# Patient Record
Sex: MALE | Race: BLACK OR AFRICAN AMERICAN | NOT HISPANIC OR LATINO | Employment: STUDENT | URBAN - METROPOLITAN AREA
[De-identification: names, ages, dates, MRNs, and addresses within clinical notes are randomized per-mention and may not be internally consistent; named-entity substitution may affect disease eponyms.]

---
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
## 2019-05-21 ENCOUNTER — OFFICE VISIT (OUTPATIENT)
Dept: PEDIATRICS | Facility: CLINIC | Age: 19
End: 2019-05-21
Payer: MEDICAID

## 2019-05-21 VITALS
DIASTOLIC BLOOD PRESSURE: 75 MMHG | TEMPERATURE: 98 F | SYSTOLIC BLOOD PRESSURE: 122 MMHG | HEART RATE: 70 BPM | BODY MASS INDEX: 33.21 KG/M2 | RESPIRATION RATE: 16 BRPM | HEIGHT: 67 IN | WEIGHT: 211.63 LBS

## 2019-05-21 DIAGNOSIS — Z00.00 WELL ADULT EXAM: Primary | ICD-10-CM

## 2019-05-21 DIAGNOSIS — B35.0 TINEA CAPITIS: ICD-10-CM

## 2019-05-21 PROCEDURE — 99999 PR PBB SHADOW E&M-EST. PATIENT-LVL III: CPT | Mod: PBBFAC,,, | Performed by: PEDIATRICS

## 2019-05-21 PROCEDURE — 99395 PR PREVENTIVE VISIT,EST,18-39: ICD-10-PCS | Mod: 25,S$PBB,, | Performed by: PEDIATRICS

## 2019-05-21 PROCEDURE — 99999 PR PBB SHADOW E&M-EST. PATIENT-LVL III: ICD-10-PCS | Mod: PBBFAC,,, | Performed by: PEDIATRICS

## 2019-05-21 PROCEDURE — 99395 PREV VISIT EST AGE 18-39: CPT | Mod: 25,S$PBB,, | Performed by: PEDIATRICS

## 2019-05-21 PROCEDURE — 90471 IMMUNIZATION ADMIN: CPT | Mod: PBBFAC,PO,VFC

## 2019-05-21 PROCEDURE — 99213 OFFICE O/P EST LOW 20 MIN: CPT | Mod: PBBFAC,PO | Performed by: PEDIATRICS

## 2019-05-21 NOTE — PROGRESS NOTES
Chief Complaint   Patient presents with    Annual Exam       Dianne Sotelo Jr. is a 18 y.o. male who is here for a yearly physical and preventive medicine exam.  He has a lesion on his penis which she does not want me to examine, so I recommended that he see a urologist.  He will decide that at a later date.  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  His asthma is well controlled and he has an MDI which he uses occasionally.    History     Past Medical History:   Diagnosis Date    Asthma     Eczema     RSV (acute bronchiolitis due to respiratory syncytial virus) Infant       Family History   Problem Relation Age of Onset    Diabetes Father     Asthma Father     Hypertension Maternal Grandmother     Hypertension Maternal Grandfather        Social History     Socioeconomic History    Marital status: Single     Spouse name: Not on file    Number of children: Not on file    Years of education: Not on file    Highest education level: Not on file   Occupational History    Not on file   Social Needs    Financial resource strain: Not on file    Food insecurity:     Worry: Not on file     Inability: Not on file    Transportation needs:     Medical: Not on file     Non-medical: Not on file   Tobacco Use    Smoking status: Never Smoker   Substance and Sexual Activity    Alcohol use: No    Drug use: No    Sexual activity: Never   Lifestyle    Physical activity:     Days per week: Not on file     Minutes per session: Not on file    Stress: Not on file   Relationships    Social connections:     Talks on phone: Not on file     Gets together: Not on file     Attends [employer]ology service: Not on file     Active member of club or organization: Not on file     Attends meetings of clubs or organizations: Not on file     Relationship status: Not on file   Other Topics Concern    Not on file   Social History Narrative    Lives with biological dad and stepmother.  2 brothers, 1 sister.   No smokers.  No pets.  In [employer].   Doing well in school.         Review of patient's allergies indicates:  No Known Allergies    No Known Allergies    Current Outpatient Medications on File Prior to Visit   Medication Sig Dispense Refill    albuterol (PROVENTIL) 2.5 mg /3 mL (0.083 %) nebulizer solution USE 1 VIAL VIA NEBULIZER EVERY 4 HOURS AS NEEDED 75 mL 0    VENTOLIN HFA 90 mcg/actuation inhaler INHALE 2 PUFFS BY MOUTH THREE TIMES DAILY 18 g 0    [DISCONTINUED] cetirizine (ZYRTEC) 10 MG tablet Take 1 tablet (10 mg total) by mouth once daily.      [DISCONTINUED] mometasone (NASONEX) 50 mcg/actuation nasal spray 2 sprays by Nasal route once daily. 17 g 3     No current facility-administered medications on file prior to visit.        ROS:  GENERAL: denies any fever, chills, wt. Loss or gain, fatigue or malaise  HEAD:  denies headaches or trauma; has hard vesicular lesions and his occipital area which he says that he has had for about a year.  EYES:  Denies burning, itching, tearing, or discharge  EARS:  Denies earache, ear drainage, or hearing loss  MOUTH & THROAT: denies sore throat, mouth pain, or difficulty swallowing  RESPIRATORY:  Denies shortness of breath, cough, or wheeze  CARDIOVASCULAR: denies chest pain, palpitations, edema, or dyspnea  GI: denies nausea, vomiting, pain, constipation or diarrhea  URINARY:  Denies frequency or dysuria  ENDOCRINE:  No polydipsia, polyuria, or dysphagia  MUSCULOSKELETAL: denies pain or stiffness of the joints  NEUROLOGIC:  No weakness, sensory changes, seizures, confusion, memory loss, tremor or other abnormal movements        Physical Exam:  Vitals:    05/21/19 1135   BP: 122/75   Pulse: 70   Resp: 16   Temp: 98.1 °F (36.7 °C)       GEN: WD, WN, NAD, alert and playful  HEENT: EOMI, PERRL, no drainage, neck supple, no adenopathy, pharynx non-erythematous  LYMPH: no cervical or axillary lymphadenopathy  CV: RRR, s1, s2, no murmurs, no palpitations, pulses 2+  (radial)  RESP: CTAB, no increased WOB, no wheeze, no respiratory distress  GI: soft, NT, ND, +BS, no guarding or rebound tenderness  :  Not examined due to patient refusal  MSK: normal ROM, no arthralgia, strength 5/5  Neuro: alert and oriented, no weakness, DTR 2+, normal gait  Skin: no rashes or lesions  Spine: no deformities or signs of scoliosis  Psych:appropriate mood and affect      Well adult exam  -     Tdap Vaccine    Tinea capitis  -     Ambulatory Referral to Dermatology          Plan:   1) WCC: Routine WCC, healthy and doing well. .  Will also give above ___ immunizations to be UTD.   RTC in 1 year for next physical or sooner if sick.  Routine counseling given on good eating habits, routine exercise, and good sleep hygiene.

## 2019-07-17 ENCOUNTER — INITIAL CONSULT (OUTPATIENT)
Dept: DERMATOLOGY | Facility: CLINIC | Age: 19
End: 2019-07-17
Payer: MEDICAID

## 2019-07-17 VITALS — BODY MASS INDEX: 33.12 KG/M2 | WEIGHT: 211 LBS | HEIGHT: 67 IN

## 2019-07-17 DIAGNOSIS — L73.0 ACNE KELOIDALIS NUCHAE: Primary | ICD-10-CM

## 2019-07-17 PROCEDURE — 99999 PR PBB SHADOW E&M-EST. PATIENT-LVL III: ICD-10-PCS | Mod: PBBFAC,,, | Performed by: DERMATOLOGY

## 2019-07-17 PROCEDURE — 99202 PR OFFICE/OUTPT VISIT, NEW, LEVL II, 15-29 MIN: ICD-10-PCS | Mod: S$PBB,,, | Performed by: DERMATOLOGY

## 2019-07-17 PROCEDURE — 99999 PR PBB SHADOW E&M-EST. PATIENT-LVL III: CPT | Mod: PBBFAC,,, | Performed by: DERMATOLOGY

## 2019-07-17 PROCEDURE — 99213 OFFICE O/P EST LOW 20 MIN: CPT | Mod: PBBFAC,PO | Performed by: DERMATOLOGY

## 2019-07-17 PROCEDURE — 99202 OFFICE O/P NEW SF 15 MIN: CPT | Mod: S$PBB,,, | Performed by: DERMATOLOGY

## 2019-07-17 RX ORDER — CLINDAMYCIN PHOSPHATE 10 UG/ML
LOTION TOPICAL
Qty: 60 ML | Refills: 6 | Status: SHIPPED | OUTPATIENT
Start: 2019-07-17 | End: 2019-07-24

## 2019-07-17 RX ORDER — BETAMETHASONE VALERATE 0.1 %
LOTION (ML) TOPICAL
Qty: 60 ML | Refills: 0 | Status: SHIPPED | OUTPATIENT
Start: 2019-07-17

## 2019-07-17 NOTE — PROGRESS NOTES
Subjective:       Patient ID:  Dianne Sotelo Jr. is a 18 y.o. male who presents for   Chief Complaint   Patient presents with    Hair/Scalp Problem     HPI   New pt  Here today c/o bumps posterior scalp , x 2-3 yrs, itchy, no tx  Works at [employer]   Attend [employer]    Review of Systems   Constitutional: Negative for fever and chills.   Skin: Positive for itching, rash and dry skin.        Objective:    Physical Exam   Constitutional: He appears well-developed and well-nourished. No distress.   Neurological: He is alert and oriented to person, place, and time. He is not disoriented.   Psychiatric: He has a normal mood and affect.   Skin:   Areas Examined (abnormalities noted in diagram):   Head / Face Inspection Performed  Neck Inspection Performed  Chest / Axilla Inspection Performed  Back Inspection Performed  RUE Inspected  LUE Inspection Performed              Diagram Legend     Erythematous scaling macule/papule c/w actinic keratosis       Vascular papule c/w angioma      Pigmented verrucoid papule/plaque c/w seborrheic keratosis      Yellow umbilicated papule c/w sebaceous hyperplasia      Irregularly shaped tan macule c/w lentigo     1-2 mm smooth white papules consistent with Milia      Movable subcutaneous cyst with punctum c/w epidermal inclusion cyst      Subcutaneous movable cyst c/w pilar cyst      Firm pink to brown papule c/w dermatofibroma      Pedunculated fleshy papule(s) c/w skin tag(s)      Evenly pigmented macule c/w junctional nevus     Mildly variegated pigmented, slightly irregular-bordered macule c/w mildly atypical nevus      Flesh colored to evenly pigmented papule c/w intradermal nevus       Pink pearly papule/plaque c/w basal cell carcinoma      Erythematous hyperkeratotic cursted plaque c/w SCC      Surgical scar with no sign of skin cancer recurrence      Open and closed comedones      Inflammatory papules and pustules      Verrucoid papule consistent consistent with wart      Erythematous eczematous patches and plaques     Dystrophic onycholytic nail with subungual debris c/w onychomycosis     Umbilicated papule    Erythematous-base heme-crusted tan verrucoid plaque consistent with inflamed seborrheic keratosis     Erythematous Silvery Scaling Plaque c/w Psoriasis     See annotation          Assessment / Plan:        Acne keloidalis nuchae  -     clindamycin (CLEOCIN T) 1 % lotion; Thin film aa qam  Dispense: 60 mL; Refill: 6  -     betamethasone valerate 0.1% (VALISONE) 0.1 % Lotn; AAA qhs prn  Dispense: 60 mL; Refill: 0             Follow up in about 6 weeks (around 8/28/2019).

## 2019-07-17 NOTE — LETTER
July 17, 2019      Jelly Luo MD  [address] [facility] [address]           [address], [address]  [facility] [address]  Phone: [phone]          Patient: Dianne Sotelo Jr.   MR Number: [id number]   YOB: 2000   Date of Visit: 7/17/2019       Dear Dr. Jelly Luo:    Thank you for referring Dianne Sotelo to me for evaluation. Attached you will find relevant portions of my assessment and plan of care.    If you have questions, please do not hesitate to call me. I look forward to following Dianne Sotelo along with you.    Sincerely,    Mary Anne Martin MD    Enclosure  CC:  No Recipients    If you would like to receive this communication electronically, please contact [email or web address] or [phone] to request more information on [employer] Link access.    For providers and/or their staff who would like to refer a patient to [facility], please contact us through our one-stop-shop provider referral line, [facility], at [phone].    If you feel you have received this communication in error or would no longer like to receive these types of communications, please e-mail [email or web address]

## 2019-07-24 ENCOUNTER — TELEPHONE (OUTPATIENT)
Dept: DERMATOLOGY | Facility: CLINIC | Age: 19
End: 2019-07-24

## 2019-07-24 DIAGNOSIS — L73.0 ACNE KELOIDALIS NUCHAE: Primary | ICD-10-CM

## 2019-07-24 RX ORDER — CLINDAMYCIN PHOSPHATE 10 MG/G
GEL TOPICAL
Qty: 60 G | Refills: 6 | Status: SHIPPED | OUTPATIENT
Start: 2019-07-24

## 2019-07-24 NOTE — TELEPHONE ENCOUNTER
----- Message from Mary Anne Martin MD sent at 7/24/2019  2:23 PM CDT -----  What are the preferred products?  ----- Message -----  From: Macey Hickey LPN  Sent: 7/24/2019   2:11 PM  To: Mary Anne Martin MD    Deniedtoday/ Clindamycin topical lotion  Unable to approve Unable to approve Clindamycin Phosphate Solution 1% due to unmet criteria (2) as outlined in the plan guideline below. The plan guideline [place] Fee-for-Service Medicaid (Acne Agents, Topical) requires all of the following criteria are met prior to consideration of approval: Acne Agents, Topical (must meet all): 1. The recipient is less than 21 years of age on the date of the request. 2. The member has a diagnosis of Grade 3 moderately severe nodulocystic acne (numerous papules and pustules; the occasional inflamed nodule; the back and chest may also be affected) or Grade 4 severe nodulocystic acne (numerous large, painful pustules and nodules; inflammation). 3. If the request is for a non-preferred agent - ONE of the following is required (a, b, c, or d): a. The recipient has had a treatment failure with at least one preferred product; OR b. The recipient has had an intolerable side effect to at least one preferred product; OR c. The recipient has a documented contraindication(s) to all of the preferred products that are appropriate to use for the condition being treated; OR d. There is no preferred product that is appropriate to use for the condition being treated; Note: If you believe the member has met criteria (2) as described above, please resubmit your request with additional clinically supportive documentation for consideration.

## 2019-07-24 NOTE — TELEPHONE ENCOUNTER
CU - Concurrent Use with Opioids or Benzodiazepines is Restricted MD - Maximum Dose Limits X - Prescriber Must Have X OLGA Number  DD - Drug-Drug Interactions OR - Enrollment in a Physician-Supervised Program Required YQ - Yearly Quantity Limits  Descriptive Therapeutic Class Drugs on PDL POS Edits Drugs on NPDL which Require Prior Authorization (PA) POS Edits  ACNE AGENTS, TOPICAL (1) Clindamycin Phosphate Gel CL Adapalene (Plixda) CL  *Request Form Clindamycin Phosphate Medicated Swab CL Adapalene Cream (Generic; Differin®) CL  *Criteria Clindamycin Phosphate Solution CL Adapalene Gel (Generic; AG) CL  Erythromycin Gel CL Adapalene Gel Pump (Generic; AG; Differin®) CL  Erythromycin Solution CL Adapalene Lotion (Differin®) CL  Adapalene Solution CL  Adapalene/Benzoyl Peroxide (Generic; Epiduo®) CL  Adapalene/Benzoyl Peroxide with Pump (Epiduo Forte® Gel) CL  Azelaic Acid (Azelex®) CL  Benzoyl Peroxide Gel CL  Clindamycin Phosphate (Cleocin-T® Gel) CL  Clindamycin Phosphate (AG; Clindagel®) CL  Clindamycin Phosphate (Evoclin®) CL  Clindamycin Phosphate /Benzoyl Peroxide w/Pump (AG; Acanya®) CL  Clindamycin Phosphate Foam CL  Clindamycin Phosphate Lotion (Generic; Cleocin-T®) CL  Clindamycin Phosphate/Benzoyl Peroxide (Generic; BenzaClin®) CL  Clindamycin Phosphate/Benzoyl Peroxide (Generic; Duac®) CL  Clindamycin Phosphate/Benzoyl Peroxide Pump (Onexton®) CL  Clindamycin/Benzoyl Peroxide with Pump (Generic; BenzaClin®) CL  Clindamycin Phosphate/Skin Cleanser 19 (Clindacin® Pac Kit) CL  Clindamycin/Benzoyl/Emollient Combo 94 (NeuAc® Kit) CL  Clindamycin/Tretinoin (Generic; AG; Ziana®) CL  Dapsone Gel (Generic; AG; Aczone®) CL  Dapsone Gel with Pump (Aczone®) CL  Erythromycin Gel (AG) CL  Erythromycin Medicated Swab CL  Erythromycin/Benzoyl Peroxide (Generic; Benzamycin®) CL  Sulfacetamide Cleanser CL  Sulfacetamide Sodium (Ovace® Plus Cream ER) CL  Sulfacetamide Sodium (Ovace® Plus Cleanser ER) CL  Sulfacetamide  Sodium (Ovace® Plus Foam) CL  Sulfacetamide Sodium (Ovace® Plus Lotion) CL  [place] Medicaid Preferred Drug List (PDL)/Non-Preferred Drug List (NPDL) Effective Date: July 15, 2019  Additional Point-of-Sale (POS) Edits May Apply Page  2  AG - Authorized Generic DR - Concurrent Prescriptions Must Be Written by Same Prescriber PU - Prior Use of Other Medication is Required  AL - Age Limits DS - Maximum Days Supply Allowed QL - Quantity Limits  BH - Behavioral Health Clinical Authorization Required for Children Younger Than 6  Years Old DT - Duration of Therapy Limit RX - Specific Prescription Requirements  BY - Diagnosis Codes Bypass Some Requirements DX - Diagnosis Code Requirements TD - Therapeutic Duplication  CL - More Detailed Clinical Information Required for Authorization ER - Early Refill NOT Allowed UN - Drug Use Not Warranted (Needs Appropriate  Diagnosis)  CU - Concurrent Use with Opioids or Benzodiazepines is Restricted MD - Maximum Dose Limits X - Prescriber Must Have X OLGA Number  DD - Drug-Drug Interactions PA - Enrollment in a Physician-Supervised Program Required YQ - Yearly Quantity Limits  Descriptive Therapeutic Class Drugs on PDL POS Edits Drugs on NPDL which Require Prior Authorization (PA) POS Edits  ACNE AGENTS, TOPICAL (1)  Continued  (preferred agents listed on page 1) Sulfacetamide Sodium (Ovace® Plus Wash) CL  Sulfacetamide Sodium (Ovace® Wash) CL  Sulfacetamide Sodium Cleanser ER CL  Sulfacetamide Sodium Shampoo CL  Sulfacetamide Sodium/Sulfur (Avar® LS Cleanser) CL  Sulfacetamide Sodium/Sulfur (Avar® LS Medicated Pads) CL  Sulfacetamide Sodium/Sulfur (Avar® Medicated Pads) CL  Sulfacetamide Sodium/Sulfur (Avar-e®) CL  Sulfacetamide Sodium/Sulfur (BP 10-1®) CL  Sulfacetamide Sodium/Sulfur CL  Sulfacetamide Sodium/Sulfur Cleanser (Avar®) CL  Sulfacetamide Sodium/Sulfur Cleanser CL  Sulfacetamide Sodium/Sulfur Cleanser Kit CL  Sulfacetamide Sodium/Sulfur Cream CL  Sulfacetamide Sodium/Sulfur  Foam (Avar®) CL  Sulfacetamide Sodium/Sulfur Foam (SSS 10-5®) CL  Sulfacetamide Sodium/Sulfur Lotion CL  Sulfacetamide Sodium/Sulfur Medicated Pads CL  Sulfacetamide Sodium/Sulfur Sunscreen CL  Sulfacetamide Suspension CL  Sulfacetamide/Sulfur Suspension CL  Sulfacetamide/Sulfur/Cleanser 23 (Sumaxin® CP Kit) CL  Sulfacetamide/Sulfur/Urea Cleanser CL  Tazarotene (Fabior®) CL  Tazarotene Cream (Generic; AG; Tazorac®) CL  Tazarotene Gel (Tazorac®) CL  Tretinoin (Altreno®) CL  Tretinoin Cream (Generic; Avita®; Retin-A®) CL  Tretinoin Gel (Generic; Atralin®) CL  Tretinoin Gel (Generic Avita, Generic Retin-A®; Retin-A®) CL  Tretinoin 0.06% Pump (Retin-A® Micro) CL  Tretinoin 0.04% & 0.1% Gel; Pump (Generic; AG; Retin-A® Micro) CL  Tretinoin 0.08% Pump (Retin-A® Micro) CL  [place] Preferred Drug List (PDL)/Non-Preferred Drug List (NPDL) Effective Date: July 15, 2019  Additional Point-of-Sale (POS) Edits May Apply Page  3  AG - Authorized Generic DR - Concurrent Prescriptions Must Be Written by Same Prescriber PU - Prior Use of Other Medication is Required  AL - Age Limits DS - Maximum Days Supply Allowed QL - Quantity Limits  BH - Behavioral Health Clinical Authorization Required for Children Younger Than 6  Years Old DT - Duration of Therapy Limit RX - Specific Prescription Requirements  BY - Diagnosis Codes Bypass Some Requirements DX - Diagnosis Code Requirements TD - Therapeutic Duplication  CL - More Detailed Clinical Information Required for Authorization ER - Early Refill NOT Allowed UN - Drug Use Not Warranted (Needs Appropriate  Diagnosis)  CU - Concurrent Use with Opioids or Benzodiazepines is Restricted MD - Maximum Dose Limits X - Prescriber Must Have X OLGA Number  DD - Drug-Drug Interactions VT - Enrollment in a Physician-Supervised Program Required YQ - Yearly Quantity Limits  Descriptive Therapeutic Class Drugs on PDL POS Edits Drugs on NPDL which Require Prior Authorization (PA) POS Edits  ACNE  AGENTS, TOPICAL (1)  Continued  (preferred agents listed on page 1) Tretinoin (Tretin-X®) CL  Tretinoin/Emollient 9/Skin Cleanser 1 (Tretin-X® Combo Pack)

## 2019-08-14 ENCOUNTER — OFFICE VISIT (OUTPATIENT)
Dept: DERMATOLOGY | Facility: CLINIC | Age: 19
End: 2019-08-14
Payer: MEDICAID

## 2019-08-14 VITALS — BODY MASS INDEX: 33.12 KG/M2 | HEIGHT: 67 IN | WEIGHT: 211 LBS

## 2019-08-14 DIAGNOSIS — L73.0 ACNE KELOIDALIS NUCHAE: Primary | ICD-10-CM

## 2019-08-14 PROCEDURE — 99999 PR PBB SHADOW E&M-EST. PATIENT-LVL III: ICD-10-PCS | Mod: PBBFAC,,, | Performed by: DERMATOLOGY

## 2019-08-14 PROCEDURE — 99213 PR OFFICE/OUTPT VISIT, EST, LEVL III, 20-29 MIN: ICD-10-PCS | Mod: S$PBB,,, | Performed by: DERMATOLOGY

## 2019-08-14 PROCEDURE — 99213 OFFICE O/P EST LOW 20 MIN: CPT | Mod: S$PBB,,, | Performed by: DERMATOLOGY

## 2019-08-14 PROCEDURE — 99213 OFFICE O/P EST LOW 20 MIN: CPT | Mod: PBBFAC,PO | Performed by: DERMATOLOGY

## 2019-08-14 PROCEDURE — 99999 PR PBB SHADOW E&M-EST. PATIENT-LVL III: CPT | Mod: PBBFAC,,, | Performed by: DERMATOLOGY

## 2019-08-14 NOTE — PROGRESS NOTES
Subjective:       Patient ID:  Dianne Sotelo Jr. is a 19 y.o. male who presents for   Chief Complaint   Patient presents with    Acne     HPI   LOV 7/17/2019  Unable to fill clinda per insurance, has been treating with betamethasone, noticed progress  Acne keloidalis nuchae  -     clindamycin (CLEOCIN T) 1 % lotion; Thin film aa qam  Dispense: 60 mL; Refill: 6  -     betamethasone valerate 0.1% (VALISONE) 0.1 % Lotn; AAA qhs prn  Dispense: 60 mL; Refill: 0    Review of Systems   Constitutional: Negative for fever and chills.   Skin: Positive for itching, rash and dry skin.        Objective:    Physical Exam   Constitutional: He appears well-developed and well-nourished. No distress.   Neurological: He is alert and oriented to person, place, and time. He is not disoriented.   Psychiatric: He has a normal mood and affect.   Skin:   Areas Examined (abnormalities noted in diagram):   Head / Face Inspection Performed  Neck Inspection Performed  Chest / Axilla Inspection Performed  Back Inspection Performed  RUE Inspected  LUE Inspection Performed              Diagram Legend     Erythematous scaling macule/papule c/w actinic keratosis       Vascular papule c/w angioma      Pigmented verrucoid papule/plaque c/w seborrheic keratosis      Yellow umbilicated papule c/w sebaceous hyperplasia      Irregularly shaped tan macule c/w lentigo     1-2 mm smooth white papules consistent with Milia      Movable subcutaneous cyst with punctum c/w epidermal inclusion cyst      Subcutaneous movable cyst c/w pilar cyst      Firm pink to brown papule c/w dermatofibroma      Pedunculated fleshy papule(s) c/w skin tag(s)      Evenly pigmented macule c/w junctional nevus     Mildly variegated pigmented, slightly irregular-bordered macule c/w mildly atypical nevus      Flesh colored to evenly pigmented papule c/w intradermal nevus       Pink pearly papule/plaque c/w basal cell carcinoma      Erythematous hyperkeratotic cursted plaque c/w  SCC      Surgical scar with no sign of skin cancer recurrence      Open and closed comedones      Inflammatory papules and pustules      Verrucoid papule consistent consistent with wart     Erythematous eczematous patches and plaques     Dystrophic onycholytic nail with subungual debris c/w onychomycosis     Umbilicated papule    Erythematous-base heme-crusted tan verrucoid plaque consistent with inflamed seborrheic keratosis     Erythematous Silvery Scaling Plaque c/w Psoriasis     See annotation      Assessment / Plan:        Acne keloidalis nuchae    improved  Continue betamethasone bid prn  discussed avoiding chronic use and to use only on affected areas- risk atrophy, striae, ecchymoses, hypopigmentation  Will do PA for clindamycin topical         Follow up if symptoms worsen or fail to improve.